# Patient Record
Sex: FEMALE | Race: WHITE | NOT HISPANIC OR LATINO | URBAN - METROPOLITAN AREA
[De-identification: names, ages, dates, MRNs, and addresses within clinical notes are randomized per-mention and may not be internally consistent; named-entity substitution may affect disease eponyms.]

---

## 2023-05-22 ENCOUNTER — APPOINTMENT (OUTPATIENT)
Dept: PRIMARY CARE | Facility: CLINIC | Age: 38
End: 2023-05-22
Payer: COMMERCIAL

## 2023-11-29 ENCOUNTER — TELEMEDICINE (OUTPATIENT)
Dept: OBSTETRICS AND GYNECOLOGY | Facility: CLINIC | Age: 38
End: 2023-11-29
Payer: COMMERCIAL

## 2023-11-29 DIAGNOSIS — G43.709 CHRONIC MIGRAINE WITHOUT AURA WITHOUT STATUS MIGRAINOSUS, NOT INTRACTABLE: ICD-10-CM

## 2023-11-29 DIAGNOSIS — E28.2 PCOS (POLYCYSTIC OVARIAN SYNDROME): ICD-10-CM

## 2023-11-29 DIAGNOSIS — Z30.41 ORAL CONTRACEPTIVE USE: ICD-10-CM

## 2023-11-29 DIAGNOSIS — N93.9 ABNORMAL UTERINE BLEEDING (AUB): Primary | ICD-10-CM

## 2023-11-29 PROBLEM — G43.909 MIGRAINE: Status: ACTIVE | Noted: 2021-08-16

## 2023-11-29 PROCEDURE — 99214 OFFICE O/P EST MOD 30 MIN: CPT | Performed by: OBSTETRICS & GYNECOLOGY

## 2023-11-29 NOTE — PROGRESS NOTES
GYNECOLOGY VIRTUAL VISIT PROGRESS NOTE          CC:     Chief Complaint   Patient presents with    Vaginal Bleeding     On Slynd for PCOS, having AUB        HPI:  Gabrielle Thompson is scheduled today for virtual visit for AUB issues with use of Rx Slynd.   Audio and Visual communication real-time were utilized and verbal consent was obtained for the encounter.    Patient states the first 2 months that she was on Slynd she did not have a regular cycle.  Since then she has been having a cycle that is occurring at mid pack approximately day 10 of pill use not during the placebo pills.  She has had heavier cycles at times as well as bloating and up to 10 days of a cycle which is longer than her normal cycle.  She has no hypertension or diabetes, her lipid panel had a mildly high cholesterol level but her HDL level is higher which is bumping up her total cholesterol level), her PCP is not concerned about her lab results and she is not on any cholesterol medication.  She has migraines but no aura.  New GYN complaints.  Doing well on current medication, no side effects.  On OCPs for cycle regulation due to PCOS, Hx of infertility so does not need for birth control.      ROS:  GYN - see HPI      PHYSICAL EXAM:  VS:  n/a (virtual visit)  GEN:  A&O, NAD  PSYCH:  normal affect, non-anxious      LAB RESULTS:  9/29/23:  total  cholesterol=215, HDL=70, DWE=293, glucose=91, TSH=1.47      IMPRESSION/PLAN:    Problem List Items Addressed This Visit       PCOS (polycystic ovarian syndrome)    Migraine     Other Visit Diagnoses       Abnormal uterine bleeding (AUB)    -  Primary    Oral contraceptive use        Relevant Medications    norethindrone-e.estradioL-iron (Lo Loestrin) 1 mg-10 mcg (24)/10 mcg (2) tablet          AUB:  Previously was having AUB-O due to PCOS, now having AUB-I due to use of progesterone-only BCPs.  Given patient's age > 35 and her H\x of migraines (no aura, no other VTE risk factors) plan was to start  with progesterone -BCP 1st and to change to a very low-dose combination OCP if failed of Slynd.  Only other progesterone only BCP available is norethindrone, which is likely to have a higher incidence of AUB symptoms than Slynd.  Given that patient is having persistent AUB with Slynd it is reasonable to change her over to a low-estrogen dose combination OCP.  Rx for Lo Loestrin provided, use/AE reviewed.  If any worsening of migraines on combination OCPs then will need to stop the OCPs and consider other options to regular her periods with her PCOS (such as cyclic progesterone withdrawal bleeds).  Normal TSH level.    F/U PRN.      Farrukh Segovia DO

## 2024-05-09 ENCOUNTER — OFFICE VISIT (OUTPATIENT)
Dept: OBSTETRICS AND GYNECOLOGY | Facility: CLINIC | Age: 39
End: 2024-05-09
Payer: COMMERCIAL

## 2024-05-09 VITALS
HEIGHT: 66 IN | WEIGHT: 150.1 LBS | SYSTOLIC BLOOD PRESSURE: 114 MMHG | BODY MASS INDEX: 24.12 KG/M2 | DIASTOLIC BLOOD PRESSURE: 76 MMHG

## 2024-05-09 DIAGNOSIS — Z30.41 ORAL CONTRACEPTIVE USE: ICD-10-CM

## 2024-05-09 DIAGNOSIS — E28.2 PCOS (POLYCYSTIC OVARIAN SYNDROME): ICD-10-CM

## 2024-05-09 DIAGNOSIS — Z01.419 NORMAL GYNECOLOGIC EXAMINATION: Primary | ICD-10-CM

## 2024-05-09 PROCEDURE — 99395 PREV VISIT EST AGE 18-39: CPT | Performed by: OBSTETRICS & GYNECOLOGY

## 2024-05-09 PROCEDURE — 1036F TOBACCO NON-USER: CPT | Performed by: OBSTETRICS & GYNECOLOGY

## 2024-05-09 RX ORDER — RIZATRIPTAN BENZOATE 10 MG/1
TABLET, ORALLY DISINTEGRATING ORAL
COMMUNITY
Start: 2024-02-24

## 2024-05-09 NOTE — PROGRESS NOTES
"GYNECOLOGY PROGRESS NOTE        CC:    Chief Complaint   Patient presents with    Annual Exam     Est patient - annual exam - no other issues - needs refill on the BC  Chaperone anabell ortiz ma       HPI:  Gabrielle Thompson is here for a routine GYN examination.  No GYN c/o, no AUB.  Not do well with Slynd had persistent breakthrough bleeding, doing well with lowest dose estrogen pill low Loestrin.  She is not really having cycles with taking these OCPs.  She is a non-smoker, she does not have HTN/DM, she has mildly elevated cholesterol but is not on any Rx and is working on lifestyle interventions for this.  She does have migraines but no aura symptoms.      ROS:  GI - no blood in BMs  URO - no hematuria  GYN - no AUB or vaginal discharge  PSYCH - mood OK        PHYSICAL EXAM:  /76 (BP Location: Left arm, Patient Position: Sitting)   Ht 1.676 m (5' 6\")   Wt 68.1 kg (150 lb 1.6 oz)   BMI 24.23 kg/m²   GEN:  A&O, NAD  ABD:  NT/ND, soft, no palpable masses  URO:  normal urethra, no bladder TTP  GYN:  normal vulva and perineum w/o lesions or ulcers, normal vagina without discharge or lesions, normal cervix without lesions or discharge or CMT, uterus NT/NE, adnexa mobile and NT/NE  BREAST:  no masses or TTP, no skin lesions or nipple discharge  DERM:  no hirsutism or acne   PSYCH:  normal affect, non-anxious    LAB RESULTS (9/23/23 - [facility]):  -Glucose:  92 (on CMP)  -Lipid panel:  total cholesterol=215, HDL=70, UTO=805, triglycerides=83  -TSH=1.470      IMPRESSION/PLAN:  Problem List Items Addressed This Visit       PCOS (polycystic ovarian syndrome)     Other Visit Diagnoses       Normal gynecologic examination    -  Primary    Oral contraceptive use        Relevant Medications    norethindrone-e.estradioL-iron (Lo Loestrin) 1 mg-10 mcg (24)/10 mcg (2) tablet             Pap and HPV normal in 2020, no Hx of HGSIL, next due in 2025.   ASCCP pap smear screening guidelines reviewed with the " patient.    OCP  use:  Doing well with OCPs for GYN issues of AUB/cramps.  Refill Rx for Lo Loestrin sent for 1 year--did not tolerate Slynd Rx due to BTB.  Previously recommended lowest-dose estrogen dose OCP, therefore patient on  Lo Loestrin OCPs.  If any medication required for lipids or at age 40+ will readdress the use of estrogen based birth control.      PCOS FOLLOW UP:    -AUB:  amenorrheic with OCP use (Lo Loestrin)  -Glucophage Rx:  not indicated, low BMI and normal glucose level   -Infertility:  yes, previously treated  -Labs:  9/2023 at PCP (glucose=92, lipid panel minimally elevated--total cholesterol of 215 likely due to high HDL level, LDL near optimal, normal TG)  -Diet/Exercise:  working on to lower cholesterol          Farrukh Segovia, DO

## 2024-05-10 PROBLEM — Z01.419 NORMAL GYNECOLOGIC EXAMINATION: Status: ACTIVE | Noted: 2024-05-10

## 2024-05-10 PROBLEM — Z30.41 ORAL CONTRACEPTIVE USE: Status: ACTIVE | Noted: 2024-05-10

## 2024-05-10 PROBLEM — G43.909 MIGRAINE: Status: RESOLVED | Noted: 2021-08-16 | Resolved: 2024-05-10

## 2024-05-10 PROBLEM — G43.009 MIGRAINE WITHOUT AURA AND WITHOUT STATUS MIGRAINOSUS, NOT INTRACTABLE: Status: ACTIVE | Noted: 2024-05-10

## 2025-03-26 DIAGNOSIS — Z30.41 ORAL CONTRACEPTIVE USE: ICD-10-CM

## 2025-03-26 RX ORDER — NORETHINDRONE ACETATE AND ETHINYL ESTRADIOL 1MG-20(21)
1 KIT ORAL DAILY
Qty: 84 TABLET | Refills: 3 | Status: SHIPPED | OUTPATIENT
Start: 2025-03-26 | End: 2026-03-26

## 2025-05-15 ENCOUNTER — APPOINTMENT (OUTPATIENT)
Dept: OBSTETRICS AND GYNECOLOGY | Facility: CLINIC | Age: 40
End: 2025-05-15
Payer: COMMERCIAL